# Patient Record
Sex: FEMALE | Race: BLACK OR AFRICAN AMERICAN | NOT HISPANIC OR LATINO | URBAN - METROPOLITAN AREA
[De-identification: names, ages, dates, MRNs, and addresses within clinical notes are randomized per-mention and may not be internally consistent; named-entity substitution may affect disease eponyms.]

---

## 2020-07-25 ENCOUNTER — TELEPHONE ENCOUNTER (OUTPATIENT)
Dept: URBAN - METROPOLITAN AREA CLINIC 13 | Facility: CLINIC | Age: 60
End: 2020-07-25

## 2020-07-25 RX ORDER — OLOPATADINE HYDROCHLORIDE 600 UG/1
INSTILL 1 SQUIRT TWICE DAILY SPRAY, METERED NASAL
Refills: 0 | OUTPATIENT
End: 2019-08-09

## 2020-07-25 RX ORDER — POLYETHYLENE GLYCOL 3350, SODIUM SULFATE, SODIUM CHLORIDE, POTASSIUM CHLORIDE, ASCORBIC ACID, SODIUM ASCORBATE 7.5-2.691G
TAKE 32 OZ AS DIRECTED 5:00PM THE EVENING BEFORE AND 6HR PRIOR TO PROCEDURE KIT ORAL
Qty: 1 | Refills: 0 | OUTPATIENT
Start: 2012-07-03 | End: 2019-08-09

## 2020-07-25 RX ORDER — LUBIPROSTONE 24 UG/1
TAKE 1 CAPSULE DAILY CAPSULE, GELATIN COATED ORAL
Refills: 0 | OUTPATIENT
End: 2019-08-09

## 2020-07-26 ENCOUNTER — TELEPHONE ENCOUNTER (OUTPATIENT)
Dept: URBAN - METROPOLITAN AREA CLINIC 13 | Facility: CLINIC | Age: 60
End: 2020-07-26

## 2020-07-26 RX ORDER — KETOROLAC TROMETHAMINE 10 MG/1
TABLET, FILM COATED ORAL
Qty: 20 | Refills: 0 | Status: ACTIVE | COMMUNITY
Start: 2019-12-11

## 2020-07-26 RX ORDER — EZETIMIBE 10 MG/1
TAKE 1 TABLET DAILY TABLET ORAL
Qty: 1 | Refills: 0 | Status: ACTIVE | COMMUNITY
Start: 2018-11-02

## 2020-07-26 RX ORDER — MUPIROCIN 20 MG/G
OINTMENT TOPICAL
Qty: 22 | Refills: 0 | Status: ACTIVE | COMMUNITY
Start: 2019-01-31

## 2020-07-26 RX ORDER — METOPROLOL TARTRATE 25 MG/1
TABLET, FILM COATED ORAL
Qty: 180 | Refills: 0 | Status: ACTIVE | COMMUNITY
Start: 2020-02-27

## 2020-07-26 RX ORDER — DULAGLUTIDE 1.5 MG/.5ML
INJECTION, SOLUTION SUBCUTANEOUS
Qty: 2 | Refills: 0 | Status: ACTIVE | COMMUNITY
Start: 2018-12-26

## 2020-07-26 RX ORDER — CIPROFLOXACIN AND DEXAMETHASONE 3; 1 MG/ML; MG/ML
SUSPENSION/ DROPS AURICULAR (OTIC)
Qty: 8 | Refills: 0 | Status: ACTIVE | COMMUNITY
Start: 2011-10-23

## 2020-07-26 RX ORDER — ASPRIN AND EXTENDED-RELEASE DIPYRIDAMOLE 25; 200 MG/1; MG/1
CAPSULE ORAL
Qty: 60 | Refills: 0 | Status: ACTIVE | COMMUNITY
Start: 2019-07-01

## 2020-07-26 RX ORDER — DORZOLAMIDE HCL/PF 2 %
INSTILL 1 DROP INTO RIGHT EYE 2 TIMES DAILY DROPS OPHTHALMIC (EYE)
Refills: 0 | Status: ACTIVE | COMMUNITY
Start: 2018-08-29

## 2020-07-26 RX ORDER — METRONIDAZOLE 250 MG/1
TABLET ORAL
Qty: 21 | Refills: 0 | Status: ACTIVE | COMMUNITY
Start: 2012-05-19

## 2020-07-26 RX ORDER — CLINDAMYCIN PHOSPHATE 20 MG/G
CREAM VAGINAL
Qty: 40 | Refills: 0 | Status: ACTIVE | COMMUNITY
Start: 2012-04-21

## 2020-07-26 RX ORDER — DULAGLUTIDE 1.5 MG/.5ML
INJECTION, SOLUTION SUBCUTANEOUS
Qty: 2 | Refills: 0 | Status: ACTIVE | COMMUNITY
Start: 2019-03-15

## 2020-07-26 RX ORDER — LUBIPROSTONE 8 UG/1
TAKE 1 CAPSULE TWICE DAILY CAPSULE, GELATIN COATED ORAL
Refills: 0 | Status: ACTIVE | COMMUNITY
Start: 2018-08-31

## 2020-07-26 RX ORDER — BLOOD SUGAR DIAGNOSTIC
STRIP MISCELLANEOUS
Qty: 200 | Refills: 0 | Status: ACTIVE | COMMUNITY
Start: 2018-10-01

## 2020-07-26 RX ORDER — LUBIPROSTONE 8 UG/1
CAPSULE, GELATIN COATED ORAL
Qty: 60 | Refills: 0 | Status: ACTIVE | COMMUNITY
Start: 2012-08-13

## 2020-07-26 RX ORDER — FAMCICLOVIR 500 MG/1
TABLET, FILM COATED ORAL
Qty: 21 | Refills: 0 | Status: ACTIVE | COMMUNITY
Start: 2020-03-03

## 2020-07-26 RX ORDER — TRIAMCINOLONE ACETONIDE 1 MG/G
CREAM TOPICAL
Qty: 454 | Refills: 0 | Status: ACTIVE | COMMUNITY
Start: 2020-04-02

## 2020-07-26 RX ORDER — TRIAMCINOLONE ACETONIDE 1 MG/G
CREAM TOPICAL
Qty: 454 | Refills: 0 | Status: ACTIVE | COMMUNITY
Start: 2019-01-25

## 2020-07-26 RX ORDER — ASPRIN AND EXTENDED-RELEASE DIPYRIDAMOLE 25; 200 MG/1; MG/1
CAPSULE ORAL
Qty: 60 | Refills: 0 | Status: ACTIVE | COMMUNITY
Start: 2018-09-09

## 2020-07-26 RX ORDER — ROSUVASTATIN CALCIUM 40 MG/1
TAKE 1 TABLET DAILY TABLET, FILM COATED ORAL
Refills: 0 | Status: ACTIVE | COMMUNITY

## 2020-07-26 RX ORDER — TRIAMCINOLONE ACETONIDE 5 MG/G
OINTMENT TOPICAL
Qty: 60 | Refills: 0 | Status: ACTIVE | COMMUNITY
Start: 2019-08-05

## 2020-07-26 RX ORDER — METOPROLOL SUCCINATE 25 MG/1
TAKE 1 TABLET DAILY TABLET, EXTENDED RELEASE ORAL
Refills: 0 | Status: ACTIVE | COMMUNITY
Start: 2019-05-16

## 2020-07-26 RX ORDER — THIAMINE HCL 50 MG
TAKE 1 TABLET DAILY AS DIRECTED TABLET ORAL
Refills: 0 | Status: ACTIVE | COMMUNITY

## 2020-07-26 RX ORDER — AMOXICILLIN 500 MG/1
CAPSULE ORAL
Qty: 30 | Refills: 0 | Status: ACTIVE | COMMUNITY
Start: 2019-01-16

## 2020-07-26 RX ORDER — FLUCONAZOLE 150 MG/1
TABLET ORAL
Qty: 3 | Refills: 0 | Status: ACTIVE | COMMUNITY
Start: 2019-01-03

## 2020-07-26 RX ORDER — TRIAMCINOLONE ACETONIDE 5 MG/G
OINTMENT TOPICAL
Qty: 60 | Refills: 0 | Status: ACTIVE | COMMUNITY
Start: 2019-02-06

## 2020-07-26 RX ORDER — BUTALBITA,ACETAMINOPHEN AND CAFFEINE 50; 300; 40 MG/1; MG/1; MG/1
CAPSULE ORAL
Qty: 60 | Refills: 0 | Status: ACTIVE | COMMUNITY
Start: 2019-12-27

## 2020-07-26 RX ORDER — FLUTICASONE PROPIONATE 50 UG/1
SPRAY, METERED NASAL
Qty: 16 | Refills: 0 | Status: ACTIVE | COMMUNITY
Start: 2019-02-14

## 2020-07-26 RX ORDER — FLUCONAZOLE 150 MG/1
TABLET ORAL
Qty: 2 | Refills: 0 | Status: ACTIVE | COMMUNITY
Start: 2012-04-21

## 2020-07-26 RX ORDER — ASPIRIN 81 MG/1
TAKE 1 TABLET DAILY AS DIRECTED TABLET, DELAYED RELEASE ORAL
Refills: 0 | Status: ACTIVE | COMMUNITY

## 2020-07-26 RX ORDER — GABAPENTIN 300 MG/1
TAKE 1 CAPSULE 3 TIMES DAILY CAPSULE ORAL
Refills: 0 | Status: ACTIVE | COMMUNITY

## 2020-07-26 RX ORDER — AMOXICILLIN 875 MG/1
TABLET, FILM COATED ORAL
Qty: 20 | Refills: 0 | Status: ACTIVE | COMMUNITY
Start: 2012-05-19

## 2020-07-26 RX ORDER — HYDROXYZINE HYDROCHLORIDE 10 MG/1
TAKE 1 TABLET 3-4 TIMES DAILY TABLET ORAL
Refills: 0 | Status: ACTIVE | COMMUNITY
Start: 2019-07-31

## 2020-07-26 RX ORDER — CIPROFLOXACIN AND DEXAMETHASONE 3; 1 MG/ML; MG/ML
SUSPENSION/ DROPS AURICULAR (OTIC)
Qty: 8 | Refills: 0 | Status: ACTIVE | COMMUNITY
Start: 2011-08-08

## 2020-07-26 RX ORDER — ASPRIN AND EXTENDED-RELEASE DIPYRIDAMOLE 25; 200 MG/1; MG/1
CAPSULE ORAL
Qty: 60 | Refills: 0 | Status: ACTIVE | COMMUNITY
Start: 2020-01-03

## 2020-07-26 RX ORDER — HYDROXYZINE HYDROCHLORIDE 50 MG/1
TABLET, FILM COATED ORAL
Qty: 30 | Refills: 0 | Status: ACTIVE | COMMUNITY
Start: 2019-02-14

## 2020-07-26 RX ORDER — CEPHALEXIN 500 MG/1
CAPSULE ORAL
Qty: 30 | Refills: 0 | Status: ACTIVE | COMMUNITY
Start: 2018-09-04

## 2020-07-26 RX ORDER — TRIAMCINOLONE ACETONIDE 1 MG/G
CREAM TOPICAL
Qty: 454 | Refills: 0 | Status: ACTIVE | COMMUNITY
Start: 2019-09-17

## 2020-07-26 RX ORDER — ASPRIN AND EXTENDED-RELEASE DIPYRIDAMOLE 25; 200 MG/1; MG/1
CAPSULE ORAL
Qty: 60 | Refills: 0 | Status: ACTIVE | COMMUNITY
Start: 2019-02-04

## 2020-07-26 RX ORDER — CEPHALEXIN 500 MG/1
CAPSULE ORAL
Qty: 60 | Refills: 0 | Status: ACTIVE | COMMUNITY
Start: 2019-01-31

## 2020-07-26 RX ORDER — INSULIN ASPART 100 [IU]/ML
USE AS DIRECTED INJECTION, SOLUTION INTRAVENOUS; SUBCUTANEOUS
Refills: 0 | Status: ACTIVE | COMMUNITY
Start: 2018-08-27

## 2020-07-26 RX ORDER — INSULIN GLARGINE 300 U/ML
INJECT UNIT BEDTIME INJECTION, SOLUTION SUBCUTANEOUS
Refills: 0 | Status: ACTIVE | COMMUNITY
Start: 2018-09-09

## 2020-07-26 RX ORDER — MULTIVIT-MIN/FA/LYCOPEN/LUTEIN .4-300-25
TAKE 1 TABLET DAILY TABLET ORAL
Refills: 0 | Status: ACTIVE | COMMUNITY

## 2020-07-26 RX ORDER — DULAGLUTIDE 1.5 MG/.5ML
INJECTION, SOLUTION SUBCUTANEOUS
Qty: 2 | Refills: 0 | Status: ACTIVE | COMMUNITY
Start: 2019-06-15

## 2020-07-26 RX ORDER — LOSARTAN POTASSIUM 100 MG/1
TAKE 1 TABLET DAILY TABLET, FILM COATED ORAL
Refills: 0 | Status: ACTIVE | COMMUNITY
Start: 2018-08-12

## 2021-05-04 ENCOUNTER — OFFICE VISIT (OUTPATIENT)
Dept: URBAN - METROPOLITAN AREA CLINIC 113 | Facility: CLINIC | Age: 61
End: 2021-05-04

## 2021-06-08 ENCOUNTER — TELEPHONE ENCOUNTER (OUTPATIENT)
Dept: URBAN - METROPOLITAN AREA CLINIC 113 | Facility: CLINIC | Age: 61
End: 2021-06-08

## 2021-06-08 ENCOUNTER — WEB ENCOUNTER (OUTPATIENT)
Dept: URBAN - METROPOLITAN AREA CLINIC 113 | Facility: CLINIC | Age: 61
End: 2021-06-08

## 2021-06-08 ENCOUNTER — OFFICE VISIT (OUTPATIENT)
Dept: URBAN - METROPOLITAN AREA CLINIC 113 | Facility: CLINIC | Age: 61
End: 2021-06-08
Payer: MEDICARE

## 2021-06-08 ENCOUNTER — DASHBOARD ENCOUNTERS (OUTPATIENT)
Age: 61
End: 2021-06-08

## 2021-06-08 VITALS
BODY MASS INDEX: 28 KG/M2 | DIASTOLIC BLOOD PRESSURE: 61 MMHG | TEMPERATURE: 97.8 F | HEIGHT: 64 IN | SYSTOLIC BLOOD PRESSURE: 109 MMHG | RESPIRATION RATE: 20 BRPM | HEART RATE: 99 BPM | WEIGHT: 164 LBS

## 2021-06-08 DIAGNOSIS — R19.7 DIARRHEA, UNSPECIFIED TYPE: ICD-10-CM

## 2021-06-08 DIAGNOSIS — R63.4 WEIGHT LOSS: ICD-10-CM

## 2021-06-08 DIAGNOSIS — R74.8 ELEVATED LIVER ENZYMES: ICD-10-CM

## 2021-06-08 PROCEDURE — 99203 OFFICE O/P NEW LOW 30 MIN: CPT | Performed by: INTERNAL MEDICINE

## 2021-06-08 RX ORDER — ASPRIN AND EXTENDED-RELEASE DIPYRIDAMOLE 25; 200 MG/1; MG/1
CAPSULE ORAL
Qty: 60 | Refills: 0 | Status: DISCONTINUED | COMMUNITY
Start: 2018-09-09

## 2021-06-08 RX ORDER — ASPIRIN 81 MG/1
TAKE 1 TABLET DAILY AS DIRECTED TABLET, DELAYED RELEASE ORAL
Refills: 0 | Status: ACTIVE | COMMUNITY

## 2021-06-08 RX ORDER — TRIAMCINOLONE ACETONIDE 5 MG/G
OINTMENT TOPICAL
Qty: 60 | Refills: 0 | Status: DISCONTINUED | COMMUNITY
Start: 2019-02-06

## 2021-06-08 RX ORDER — DULAGLUTIDE 1.5 MG/.5ML
INJECTION, SOLUTION SUBCUTANEOUS
Qty: 2 | Refills: 0 | Status: DISCONTINUED | COMMUNITY
Start: 2018-12-26

## 2021-06-08 RX ORDER — CLINDAMYCIN PHOSPHATE 20 MG/G
CREAM VAGINAL
Qty: 40 | Refills: 0 | Status: DISCONTINUED | COMMUNITY
Start: 2012-04-21

## 2021-06-08 RX ORDER — BLOOD SUGAR DIAGNOSTIC
STRIP MISCELLANEOUS
Qty: 200 | Refills: 0 | Status: DISCONTINUED | COMMUNITY
Start: 2018-10-01

## 2021-06-08 RX ORDER — FLUCONAZOLE 150 MG/1
TABLET ORAL
Qty: 2 | Refills: 0 | Status: DISCONTINUED | COMMUNITY
Start: 2012-04-21

## 2021-06-08 RX ORDER — INSULIN ASPART 100 [IU]/ML
USE AS DIRECTED INJECTION, SOLUTION INTRAVENOUS; SUBCUTANEOUS
Refills: 0 | Status: ACTIVE | COMMUNITY
Start: 2018-08-27

## 2021-06-08 RX ORDER — AMOXICILLIN 500 MG/1
CAPSULE ORAL
Qty: 30 | Refills: 0 | Status: DISCONTINUED | COMMUNITY
Start: 2019-01-16

## 2021-06-08 RX ORDER — METOPROLOL TARTRATE 25 MG/1
TABLET, FILM COATED ORAL
Qty: 180 | Refills: 0 | Status: ACTIVE | COMMUNITY
Start: 2020-02-27

## 2021-06-08 RX ORDER — FAMCICLOVIR 500 MG/1
1 TABLET TABLET, FILM COATED ORAL THREE TIMES A DAY
Refills: 0 | Status: ACTIVE | COMMUNITY
Start: 2020-03-03

## 2021-06-08 RX ORDER — EZETIMIBE 10 MG/1
TAKE 1 TABLET DAILY TABLET ORAL
Qty: 1 | Refills: 0 | Status: ACTIVE | COMMUNITY
Start: 2018-11-02

## 2021-06-08 RX ORDER — DORZOLAMIDE HCL/PF 2 %
INSTILL 1 DROP INTO RIGHT EYE 2 TIMES DAILY DROPS OPHTHALMIC (EYE)
Refills: 0 | Status: DISCONTINUED | COMMUNITY
Start: 2018-08-29

## 2021-06-08 RX ORDER — ROSUVASTATIN CALCIUM 40 MG/1
TAKE 1 TABLET DAILY TABLET, FILM COATED ORAL
Refills: 0 | Status: ACTIVE | COMMUNITY

## 2021-06-08 RX ORDER — METRONIDAZOLE 250 MG/1
TABLET ORAL
Qty: 21 | Refills: 0 | Status: DISCONTINUED | COMMUNITY
Start: 2012-05-19

## 2021-06-08 RX ORDER — HYDROXYZINE HYDROCHLORIDE 10 MG/1
TAKE 1 TABLET 3-4 TIMES DAILY TABLET ORAL
Refills: 0 | Status: DISCONTINUED | COMMUNITY
Start: 2019-07-31

## 2021-06-08 RX ORDER — GABAPENTIN 300 MG/1
TAKE 1 CAPSULE 3 TIMES DAILY CAPSULE ORAL
Refills: 0 | Status: ACTIVE | COMMUNITY

## 2021-06-08 RX ORDER — TRIAMCINOLONE ACETONIDE 1 MG/G
CREAM TOPICAL
Qty: 454 | Refills: 0 | Status: ACTIVE | COMMUNITY
Start: 2019-01-25

## 2021-06-08 RX ORDER — SODIUM, POTASSIUM,MAG SULFATES 17.5-3.13G
354 ML SOLUTION, RECONSTITUTED, ORAL ORAL ONCE
Qty: 354 ML | Refills: 0 | OUTPATIENT
Start: 2021-06-08 | End: 2021-06-08

## 2021-06-08 RX ORDER — AMOXICILLIN 875 MG/1
TABLET, FILM COATED ORAL
Qty: 20 | Refills: 0 | Status: DISCONTINUED | COMMUNITY
Start: 2012-05-19

## 2021-06-08 RX ORDER — THIAMINE HCL 50 MG
TAKE 1 TABLET DAILY AS DIRECTED TABLET ORAL
Refills: 0 | Status: DISCONTINUED | COMMUNITY

## 2021-06-08 RX ORDER — CLOBETASOL PROPIONATE 0.5 MG/G
1 APPLICATION OINTMENT TOPICAL AS NEEDED
Status: ACTIVE | COMMUNITY

## 2021-06-08 RX ORDER — INSULIN GLARGINE 300 U/ML
INJECT UNIT BEDTIME INJECTION, SOLUTION SUBCUTANEOUS
Refills: 0 | Status: ACTIVE | COMMUNITY
Start: 2018-09-09

## 2021-06-08 RX ORDER — FLUTICASONE PROPIONATE 50 UG/1
SPRAY, METERED NASAL
Qty: 16 | Refills: 0 | Status: DISCONTINUED | COMMUNITY
Start: 2019-02-14

## 2021-06-08 RX ORDER — CIPROFLOXACIN AND DEXAMETHASONE 3; 1 MG/ML; MG/ML
SUSPENSION/ DROPS AURICULAR (OTIC)
Qty: 8 | Refills: 0 | Status: DISCONTINUED | COMMUNITY
Start: 2011-08-08

## 2021-06-08 RX ORDER — CEPHALEXIN 500 MG/1
CAPSULE ORAL
Qty: 30 | Refills: 0 | Status: DISCONTINUED | COMMUNITY
Start: 2018-09-04

## 2021-06-08 RX ORDER — LUBIPROSTONE 8 UG/1
CAPSULE, GELATIN COATED ORAL
Qty: 60 | Refills: 0 | Status: ACTIVE | COMMUNITY
Start: 2012-08-13

## 2021-06-08 RX ORDER — BUTALBITA,ACETAMINOPHEN AND CAFFEINE 50; 300; 40 MG/1; MG/1; MG/1
CAPSULE ORAL
Qty: 60 | Refills: 0 | Status: DISCONTINUED | COMMUNITY
Start: 2019-12-27

## 2021-06-08 RX ORDER — MULTIVIT-MIN/FA/LYCOPEN/LUTEIN .4-300-25
TAKE 1 TABLET DAILY TABLET ORAL
Refills: 0 | Status: DISCONTINUED | COMMUNITY

## 2021-06-08 RX ORDER — MUPIROCIN 20 MG/G
OINTMENT TOPICAL
Qty: 22 | Refills: 0 | Status: DISCONTINUED | COMMUNITY
Start: 2019-01-31

## 2021-06-08 RX ORDER — LOSARTAN POTASSIUM 100 MG/1
TAKE 1 TABLET DAILY TABLET, FILM COATED ORAL
Refills: 0 | Status: ACTIVE | COMMUNITY
Start: 2018-08-12

## 2021-06-08 RX ORDER — POLYETHYLENE GLYCOL 3350, SODIUM CHLORIDE, SODIUM BICARBONATE AND POTASSIUM CHLORIDE 420G
AS DIRECTED KIT ORAL ONCE
Qty: 420 GRAM | Refills: 0 | OUTPATIENT
Start: 2021-06-08 | End: 2021-06-09

## 2021-06-08 RX ORDER — METOPROLOL SUCCINATE 25 MG/1
TAKE 1 TABLET DAILY TABLET, EXTENDED RELEASE ORAL
Refills: 0 | Status: DISCONTINUED | COMMUNITY
Start: 2019-05-16

## 2021-06-08 RX ORDER — KETOROLAC TROMETHAMINE 10 MG/1
TABLET, FILM COATED ORAL
Qty: 20 | Refills: 0 | Status: DISCONTINUED | COMMUNITY
Start: 2019-12-11

## 2021-06-08 RX ORDER — HYDROXYZINE HYDROCHLORIDE 50 MG/1
TABLET, FILM COATED ORAL
Qty: 30 | Refills: 0 | Status: DISCONTINUED | COMMUNITY
Start: 2019-02-14

## 2021-06-08 NOTE — HPI-TODAY'S VISIT:
Ms. Christianson is a 60-year-old woman with a history of constipation, esophageal reflux, diabetes mellitus type 2, left below-knee amputation, coronary artery disease status post stent x1 on Aggrenox, and chronic kidney disease, referred by Dr. Cesar, for follow-up.  A copy of the document will be sent to referring provider.  She was last seen in this office in August 2019 for evaluation of elevated LFTs.  At that time her LFTs were found to have been elevated since June, and she had discontinued statin therapy.  She denied EtOH use or history of IV drug abuse.  Her chronic constipation was well controlled on Amitiza 8 mcg twice daily.  Labs at that time revealed T bili 0.5, , AST 63, .  Further labs to evaluate for autoimmune, hereditary and viral etiology of chronic liver disease, and abdominal ultrasound were ordered.  Ultrasound revealed gallbladder sludge otherwise unremarkable exam. Hepatitis serologies (8/26/2019): Positive GETACHEW.  Nonreactive hepatitis B, hepatitis A, and hep C antibodies. Iron studies (8/22/2019): Iron 59, TIBC 269, iron sat 21.9.  Labs to date (4/29/2021): CMP revealed AST 76, , , T bili 0.2.  CBC showed hemoglobin 10.5, hematocrit 34.5, MCV 88.7, platelets 234.  Hemoglobin A1c 7.8.   She reports a new problem of diarrhea. For the last 1-2 months she reports post prandial diarrhea and with associated fecal urgency,  2-3 times a week. During these episodes, she is having 4-5 loose/watery stools without red blood per rectum or melena. She reports minimal improvement with dicyclomine 10 mg as needed. She avoids dairy products. Around the time of onset of the diarrhea, she reports being on a course of antibiotics. She is unsure why she was on antibiotics. She is compliant with Amitiza 8mcg as needed for exacerbations in constipation. She denies nocturnal diarrhea or fecal incontinence. She also reports a 15lb unintentional weight loss over the past 3-4 months. She has a normal appetite. She denies abdominal pain, nausea, or vomiting. She denies fevers, chills, or night sweats. She denies any upper GI complaints of difficulty swallowing, regurgitation or nocturnal reflux symptoms. Denies NSAID use.   She is scheduled to have an abdominal ultrasound on 6/11/2021.

## 2021-06-09 PROBLEM — 62315008: Status: ACTIVE | Noted: 2021-06-08

## 2021-06-09 PROBLEM — 89362005: Status: ACTIVE | Noted: 2021-06-08

## 2021-06-13 LAB
A/G RATIO: 1.7
ACTIN (SMOOTH MUSCLE) ANTIBODY: 6
ALBUMIN: 4.1
ALKALINE PHOSPHATASE: 116
ALPHA-1-ANTITRYPSIN, SERUM: 122
ALT (SGPT): 83
ANA DIRECT: NEGATIVE
AST (SGOT): 55
BASO (ABSOLUTE): 0
BASOS: 1
BILIRUBIN, TOTAL: <0.2
BUN/CREATININE RATIO: 16
BUN: 16
CALCIUM: 8.9
CARBON DIOXIDE, TOTAL: 25
CHLORIDE: 106
CREATININE: 0.97
EGFR IF AFRICN AM: 74
EGFR IF NONAFRICN AM: 65
EOS (ABSOLUTE): 0.2
EOS: 3
GGT: 42
GLOBULIN, TOTAL: 2.4
GLUCOSE: 80
HEMATOCRIT: 32.8
HEMATOLOGY COMMENTS:: (no result)
HEMOGLOBIN: 10.7
IMMATURE CELLS: (no result)
IMMATURE GRANS (ABS): 0
IMMATURE GRANULOCYTES: 0
IMMUNOGLOBULIN A, QN, SERUM: 227
IMMUNOGLOBULIN E, TOTAL: 668
IMMUNOGLOBULIN G, QN, SERUM: 1247
IMMUNOGLOBULIN M, QN, SERUM: 84
LYMPHS (ABSOLUTE): 3.5
LYMPHS: 44
MCH: 28
MCHC: 32.6
MCV: 86
MITOCHONDRIAL (M2) ANTIBODY: <20
MONOCYTES(ABSOLUTE): 0.7
MONOCYTES: 9
NEUTROPHILS (ABSOLUTE): 3.4
NEUTROPHILS: 43
NRBC: (no result)
PHENOTYPE (PI): (no result)
PLATELETS: 246
POTASSIUM: 4.7
PROTEIN, TOTAL: 6.5
RBC: 3.82
RDW: 14
SODIUM: 142
WBC: 8

## 2021-07-01 ENCOUNTER — OFFICE VISIT (OUTPATIENT)
Dept: URBAN - METROPOLITAN AREA SURGERY CENTER 25 | Facility: SURGERY CENTER | Age: 61
End: 2021-07-01

## 2021-07-20 ENCOUNTER — OFFICE VISIT (OUTPATIENT)
Dept: URBAN - METROPOLITAN AREA CLINIC 113 | Facility: CLINIC | Age: 61
End: 2021-07-20